# Patient Record
Sex: MALE | Race: WHITE
[De-identification: names, ages, dates, MRNs, and addresses within clinical notes are randomized per-mention and may not be internally consistent; named-entity substitution may affect disease eponyms.]

---

## 2018-03-23 ENCOUNTER — HOSPITAL ENCOUNTER (EMERGENCY)
Dept: HOSPITAL 17 - NEPD | Age: 23
Discharge: HOME | End: 2018-03-23
Payer: COMMERCIAL

## 2018-03-23 VITALS
RESPIRATION RATE: 16 BRPM | DIASTOLIC BLOOD PRESSURE: 74 MMHG | HEART RATE: 77 BPM | SYSTOLIC BLOOD PRESSURE: 118 MMHG | OXYGEN SATURATION: 99 % | TEMPERATURE: 98 F

## 2018-03-23 VITALS — HEIGHT: 71 IN | BODY MASS INDEX: 21.3 KG/M2 | WEIGHT: 152.12 LBS

## 2018-03-23 VITALS
OXYGEN SATURATION: 98 % | HEART RATE: 82 BPM | TEMPERATURE: 98.4 F | RESPIRATION RATE: 20 BRPM | SYSTOLIC BLOOD PRESSURE: 126 MMHG | DIASTOLIC BLOOD PRESSURE: 77 MMHG

## 2018-03-23 DIAGNOSIS — R11.10: ICD-10-CM

## 2018-03-23 DIAGNOSIS — F10.129: Primary | ICD-10-CM
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
## 2018-03-23 DIAGNOSIS — J06.9: ICD-10-CM

## 2018-03-23 DIAGNOSIS — Z72.0: ICD-10-CM

## 2018-03-23 LAB
ALBUMIN SERPL-MCNC: 4.3 GM/DL (ref 3.4–5)
ALP SERPL-CCNC: 82 U/L (ref 45–117)
ALT SERPL-CCNC: 18 U/L (ref 12–78)
AST SERPL-CCNC: 24 U/L (ref 15–37)
BILIRUB SERPL-MCNC: 0.4 MG/DL (ref 0.2–1)
BUN SERPL-MCNC: 11 MG/DL (ref 7–18)
CALCIUM SERPL-MCNC: 8.3 MG/DL (ref 8.5–10.1)
CHLORIDE SERPL-SCNC: 106 MEQ/L (ref 98–107)
CREAT SERPL-MCNC: 1.06 MG/DL (ref 0.6–1.3)
GFR SERPLBLD BASED ON 1.73 SQ M-ARVRAT: 88 ML/MIN (ref 89–?)
GLUCOSE SERPL-MCNC: 106 MG/DL (ref 74–106)
HCO3 BLD-SCNC: 28.4 MEQ/L (ref 21–32)
PROT SERPL-MCNC: 7.6 GM/DL (ref 6.4–8.2)
SODIUM SERPL-SCNC: 142 MEQ/L (ref 136–145)

## 2018-03-23 PROCEDURE — 80053 COMPREHEN METABOLIC PANEL: CPT

## 2018-03-23 PROCEDURE — 80307 DRUG TEST PRSMV CHEM ANLYZR: CPT

## 2018-03-23 PROCEDURE — 96360 HYDRATION IV INFUSION INIT: CPT

## 2018-03-23 PROCEDURE — 99283 EMERGENCY DEPT VISIT LOW MDM: CPT

## 2018-03-23 NOTE — PD
HPI


Chief Complaint:  Alcohol/Drug Intoxication


Time Seen by Provider:  02:25


Travel History


International Travel<30 days:  No


Contact w/Intl Traveler<30days:  No


Traveled to known affect area:  No





History of Present Illness


HPI


22-year-old white male presents emergency department under [place] act by PD.  

Patient was found vomiting outside of a nightclub tonight.  Patient is visiting 

from [place] on spring break.  The patient was unable to care for himself due 

to alcohol intoxication and was brought to the ER for his safety.  The patient 

here has no complaints other than being intoxicated.  He is no longer nauseous.

  He is heavily intoxicated.  His speech is slurred.  Patient denies toxic 

ingestion.  No suicidal homicidal ideation.  No trauma.  Denies drugs.





PFSH


Past Medical History


Medical History:  Denies Significant Hx


Immunizations Current:  Yes


Tetanus Vaccination:  Unknown


Influenza Vaccination:  No





Past Surgical History


Surgical History:  No Previous Surgery





Social History


Alcohol Use:  Yes


Tobacco Use:  Yes


Substance Use:  No





Allergies-Medications


(Allergen,Severity, Reaction):  


Coded Allergies:  


     No Known Allergies (Unverified , 3/23/18)


Reported Meds & Prescriptions





Reported Meds & Active Scripts


Active


No Active Prescriptions or Reported Medications    








Review of Systems


ROS Limitations:  Intoxication





Physical Exam


Narrative


GENERAL: Well-nourished, well-developed patient.  Smells of emesis.  Heavily 

intoxicated with slurred speech and ataxia


SKIN: Warm and dry.


HEAD: Normocephalic and atraumatic.


EYES: No scleral icterus. No injection or drainage. 


ENT: No nasal drainage noted. Mucous membranes pink. Airway patent.


NECK: Supple, trachea midline.  Moves head freely without obvious discomfort.


CARDIOVASCULAR: Regular rate and rhythm without murmurs, gallops, or rubs. 


RESPIRATORY: Breath sounds equal bilaterally. No accessory muscle use.


GASTROINTESTINAL: Abdomen soft, non-tender, nondistended. 


EXTREMITIES: No cyanosis or edema.


BACK: Nontender without obvious deformity. No CVA tenderness.


NEURO: Patient is alert and oriented to person only.  Patient moves extremities 

but appears in intoxicated and ataxic from alcohol.  Nonfocal.  Slurred l 

speech.


PSYCH: No delusions.  No auditory or visual hallucinations.





Data


Data


Last Documented VS





Vital Signs








  Date Time  Temp Pulse Resp B/P (MAP) Pulse Ox O2 Delivery O2 Flow Rate FiO2


 


3/23/18 02:09 98.4 82 20 126/77 (93) 98   








Orders





 Orders


Iv Access Insert/Monitor (3/23/18 02:25)


Comprehensive Metabolic Panel (3/23/18 02:25)


Drug Screen, Random Urine (3/23/18 02:25)


Alcohol (Ethanol) (3/23/18 02:25)


Sodium Chlor 0.9% 1000 Ml Inj (Ns 1000 M (3/23/18 02:30)





Labs





Laboratory Tests








Test


  3/23/18


02:30


 


Blood Urea Nitrogen 11 MG/DL 


 


Creatinine 1.06 MG/DL 


 


Random Glucose 106 MG/DL 


 


Total Protein 7.6 GM/DL 


 


Albumin 4.3 GM/DL 


 


Calcium Level 8.3 MG/DL 


 


Alkaline Phosphatase 82 U/L 


 


Aspartate Amino Transf


(AST/SGOT) 24 U/L 


 


 


Alanine Aminotransferase


(ALT/SGPT) 18 U/L 


 


 


Total Bilirubin 0.4 MG/DL 


 


Sodium Level 142 MEQ/L 


 


Potassium Level 4.0 MEQ/L 


 


Chloride Level 106 MEQ/L 


 


Carbon Dioxide Level 28.4 MEQ/L 


 


Anion Gap 8 MEQ/L 


 


Estimat Glomerular Filtration


Rate 88 ML/MIN 


 


 


Ethyl Alcohol Level 248 MG/DL 











[facility]


Medical Decision Making


Medical Screen Exam Complete:  Yes


Emergency Medical Condition:  Yes


Medical Record Reviewed:  Yes


Interpretation(s)





Laboratory Tests








Test


  3/23/18


02:30


 


Blood Urea Nitrogen 11 MG/DL 


 


Creatinine 1.06 MG/DL 


 


Random Glucose 106 MG/DL 


 


Total Protein 7.6 GM/DL 


 


Albumin 4.3 GM/DL 


 


Calcium Level 8.3 MG/DL 


 


Alkaline Phosphatase 82 U/L 


 


Aspartate Amino Transf


(AST/SGOT) 24 U/L 


 


 


Alanine Aminotransferase


(ALT/SGPT) 18 U/L 


 


 


Total Bilirubin 0.4 MG/DL 


 


Sodium Level 142 MEQ/L 


 


Potassium Level 4.0 MEQ/L 


 


Chloride Level 106 MEQ/L 


 


Carbon Dioxide Level 28.4 MEQ/L 


 


Anion Gap 8 MEQ/L 


 


Estimat Glomerular Filtration


Rate 88 ML/MIN 


 


 


Ethyl Alcohol Level 248 MG/DL 








Differential Diagnosis


Differential diagnoses: Alcohol intoxication, substance abuse, electrolyte 

abnormality, malingering


Narrative Course


IV access is obtained.  Patient is given a liter bolus of normal saline.  

Metabolic panel and EtOH level ordered.





The patient will be monitored here in the ER until he exhibits sobriety.





Diagnosis





 Primary Impression:  


  URI alcohol intoxication


 Additional Impression:  


  vomiting


Patient Instructions:  General Instructions





***Additional Instructions:  


Rest.


Increase fluids.


Avoid alcohol.


Avoid illegal substances.


Follow-up with [facility] for detox.


Do not operate a car or any heavy machinery under the influence of alcohol or 

drugs.


Follow-up with a medical doctor this week.


Return to the ER for emergencies


Scripts


No Active Prescriptions or Reported Meds


Disposition:  01 DISCHARGE HOME


Condition:  Seymour Ramachandran Mar 23, 2018 02:45